# Patient Record
Sex: FEMALE | Race: WHITE | Employment: STUDENT | URBAN - METROPOLITAN AREA
[De-identification: names, ages, dates, MRNs, and addresses within clinical notes are randomized per-mention and may not be internally consistent; named-entity substitution may affect disease eponyms.]

---
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
## 2025-01-20 ENCOUNTER — TELEPHONE (OUTPATIENT)
Dept: OBGYN CLINIC | Age: 20
End: 2025-01-20

## 2025-01-20 RX ORDER — ONDANSETRON 4 MG/1
TABLET, FILM COATED ORAL
Qty: 30 TABLET | Refills: 2 | Status: SHIPPED | OUTPATIENT
Start: 2025-01-20

## 2025-01-20 NOTE — TELEPHONE ENCOUNTER
----- Message from Dr. Ena Reynoso MD sent at 1/20/2025  9:06 AM EST -----  Regarding: RE: N/V meds  Yes I would offer the Zofran first and see if she will take it.  Thanks  ----- Message -----  From: Kristi Xavier RN  Sent: 1/20/2025   8:21 AM EST  To: Ena Reynoso MD; Shelia Sheets RN  Subject: N/V meds                                         Pt left a voicemail saying she was prescribed Reglan at the ER and is almost out of her prescription and she wants a refill. Has not been seen by us yet for this pregnancy, has an u/s with you the 4th of February. Do you want me to offer her Zofran, refill the Reglan, tell her she has to wait until the appointment? Let me know, thanks!